# Patient Record
(demographics unavailable — no encounter records)

---

## 2021-08-27 NOTE — P
[facility]
[address]ter [address]
[place]   [address]                     PROCEDURE REPORT              
_______________________________________________________________________________
 
Name:       APRIL VILLARREAL             Room #:                     REG CL 
LUCIEN.#:      [id number]                       Account #:      [id number]  
Admission:  08/20/21    Attend Phys:    Gary Rodriguez
Discharge:              Date of Birth:  11/12/61  
                                                          Report #: [id number]
                                                                    [id number] 
_______________________________________________________________________________
THIS REPORT FOR:  
 
cc:  Preston Toro MD, Eric K. MD McElhinney, Christian C. MD                                   ~
 
 
cc:     Preston Toro MD
DATE OF SERVICE: 08/20/2021
 
PROCEDURE PERFORMED:  Colonoscopy with polypectomies.
 
HISTORY OF PRESENT ILLNESS:  The patient is a 59-year-old male with a history of
polyps, last colonoscopy approximately 8 years ago.  Denies any symptoms.  No 
family history of colon cancer.  Plan is for colonoscopy.
 
DESCRIPTION OF PROCEDURE:  The risks and benefits of the procedure were 
explained to the patient, those risks including but not limited to bleeding, 
perforation and the risk of sedation.  He understood these risks and gave 
informed consent.  Sedation was given using propofol per anesthesia.  Next, a 
digital rectal exam was initially performed, which was normal.  Next, using a 
standard Olympus colonoscope, the scope was placed in the patient's anus and 
advanced under direct vision to the cecum.  The overall prep was good.  The 
cecum and ileocecal valve were normal in appearance.  Ascending colon was 
normal.  In the transverse colon, a 5 mm sessile polyp was noted and removed by 
snare cautery.  The descending colon was normal.  In the sigmoid colon, there 
were 2 polyps noted.  A 3 mm sessile polyp removed by cold forceps.  A 5 mm 
sessile polyp removed by snare cautery.  The rectal mucosa was normal.  On 
retroflexion, no abnormalities were noted.  The scope was then withdrawn and the
procedure terminated.  The patient tolerated the procedure well.
 
IMPRESSION:
1.  Three small colonic polyps.
2.  Otherwise, normal colonoscopy.
 
RECOMMENDATIONS:
1.  Await biopsy results.
2.  If polyps are hyperplastic, repeat in 10 years.  If adenomatous polyps, 
repeat in 5 years.
 
Thank you for allowing me to participate in his care.
 
 
 
  <ELECTRONICALLY SIGNED>
   By: Gary Joya MD   
  08/27/21 0819
D: 08/20/21 0918                           _____________________________________
T: 08/20/21 2016                           Gary Joya MD     /nt